# Patient Record
Sex: MALE | Race: OTHER | HISPANIC OR LATINO | Employment: OTHER | URBAN - METROPOLITAN AREA
[De-identification: names, ages, dates, MRNs, and addresses within clinical notes are randomized per-mention and may not be internally consistent; named-entity substitution may affect disease eponyms.]

---

## 2020-07-07 ENCOUNTER — HOSPITAL ENCOUNTER (EMERGENCY)
Facility: OTHER | Age: 40
Discharge: HOME | End: 2020-07-07
Attending: EMERGENCY MEDICINE

## 2020-07-07 VITALS
HEIGHT: 72 IN | HEART RATE: 92 BPM | TEMPERATURE: 98 F | SYSTOLIC BLOOD PRESSURE: 155 MMHG | RESPIRATION RATE: 18 BRPM | OXYGEN SATURATION: 97 % | WEIGHT: 237 LBS | DIASTOLIC BLOOD PRESSURE: 90 MMHG | BODY MASS INDEX: 32.1 KG/M2

## 2020-07-07 DIAGNOSIS — G89.29 CHRONIC RIGHT SHOULDER PAIN: Primary | ICD-10-CM

## 2020-07-07 DIAGNOSIS — M25.511 CHRONIC RIGHT SHOULDER PAIN: Primary | ICD-10-CM

## 2020-07-07 PROCEDURE — 99283 EMERGENCY DEPT VISIT LOW MDM: CPT

## 2020-07-07 RX ORDER — OXYCODONE AND ACETAMINOPHEN 7.5; 325 MG/1; MG/1
1 TABLET ORAL EVERY 4 HOURS PRN
Qty: 15 TABLET | Refills: 0 | Status: SHIPPED | OUTPATIENT
Start: 2020-07-07

## 2020-07-07 NOTE — ED PROVIDER NOTES
Encounter Date: 7/7/2020       History     Chief Complaint   Patient presents with    Shoulder Pain     pt reports R shoulder sx scheduled in August. here from out of town for work and forgot mediation for pain.      Patient is a 39-year-old male with a rotator cuff injury who presents to the emergency department with right shoulder pain.  Patient been suffering with right shoulder pain for the past 2-3 months.  He here for work from [place].  He states he forgot his pain medication, Percocet 7.5 at home.  He states he contacted his PCP and pain management specialist who [place].  Patient has shoulder surgery scheduled in August.  He has been here for the past week and has been taking Motrin for his pain.  He states 1 of his coworkers is also out and he has been taking over his work, using his shoulder more which is exacerbating the pain.  Denies new symptoms associated with shoulder pain.  States that is worse with movement, raising above head.  He denies numbness or weakness.    The history is provided by the patient.     Review of patient's allergies indicates:  No Known Allergies  No past medical history on file.  No past surgical history on file.  No family history on file.  Social History     Tobacco Use    Smoking status: Not on file   Substance Use Topics    Alcohol use: Not on file    Drug use: Not on file     Review of Systems   Constitutional: Negative for chills and fever.   Respiratory: Negative for shortness of breath.    Cardiovascular: Negative for chest pain.   Gastrointestinal: Negative for nausea and vomiting.   Musculoskeletal: Positive for arthralgias (Right shoulder pain). Negative for joint swelling.   Skin: Negative for color change and rash.   Allergic/Immunologic: Negative for immunocompromised state.   Neurological: Negative for weakness, numbness and headaches.       Physical Exam     Initial Vitals [07/07/20 1019]   BP Pulse Resp  Temp SpO2   (!) 155/90 92 18 98.3 °F (36.8 °C) 97 %      MAP       --         Physical Exam    Constitutional: Vital signs are normal. He is cooperative. No distress.   HENT:   Head: Normocephalic and atraumatic.   Eyes: EOM are normal. Pupils are equal, round, and reactive to light.   Neck: Normal range of motion. Neck supple.   Cardiovascular: Normal rate, regular rhythm and intact distal pulses.   Pulmonary/Chest: Breath sounds normal. No respiratory distress.   Musculoskeletal:      Comments: Right shoulder has tenderness along the AC joint.  Pain with passive range of motion, pain unbearable at 90 degree abduction.  Symmetric  strength.   Neurological: He is alert and oriented to person, place, and time. GCS eye subscore is 4. GCS verbal subscore is 5. GCS motor subscore is 6.   Skin: Skin is warm and dry. Capillary refill takes less than 2 seconds. No rash noted.   Psychiatric: He has a normal mood and affect. His behavior is normal. Thought content normal.         ED Course   Procedures  Labs Reviewed - No data to display       Imaging Results    None          Medical Decision Making:   Initial Assessment:   Urgent evaluation of a 39 y.o. male presenting to the emergency department complaining of acute on chronic right shoulder pain after using his right shoulder during many labor at work.. Patient is afebrile, nontoxic appearing and hemodynamically stable.  -limb is distally neurovascular intact.  Patient has shown me his My Chart on his phone, is prescribed anti-inflammatories and Percocet 7.5.  Requesting refill of Percocet today.  Will provide patient with 3 day supply, is strongly encouraged to further sort this out with his pain management provider.  He has minimal to this plan.  He had no other complaints today and was stable at discharge.                                 Clinical Impression:     1. Chronic right shoulder pain            ED Disposition Condition    Discharge Stable        ED  Prescriptions     Medication Sig Dispense Start Date End Date Auth. Provider    oxyCODONE-acetaminophen (PERCOCET) 7.5-325 mg per tablet Take 1 tablet by mouth every 4 (four) hours as needed for Pain. 15 tablet 7/7/2020  Tanmay Cain PA-C        Follow-up Information     Follow up With Specialties Details Why Contact Info    [facility]  If symptoms worsen [address] [facility] [id number] [phone]                                     Tanmay Cain PA-C  07/07/20 1128

## 2020-07-07 NOTE — ED TRIAGE NOTES
Pt reports in town working from [place]. States he forgot his pain medication. Reports that he is scheduled for sx on r shoulder in August. States pain is unbearable.